# Patient Record
Sex: MALE | Race: WHITE | NOT HISPANIC OR LATINO | URBAN - METROPOLITAN AREA
[De-identification: names, ages, dates, MRNs, and addresses within clinical notes are randomized per-mention and may not be internally consistent; named-entity substitution may affect disease eponyms.]

---

## 2019-12-10 ENCOUNTER — INPATIENT (INPATIENT)
Facility: HOSPITAL | Age: 64
LOS: 2 days | Discharge: HOME | End: 2019-12-13
Attending: INTERNAL MEDICINE | Admitting: INTERNAL MEDICINE
Payer: MEDICAID

## 2019-12-10 VITALS
RESPIRATION RATE: 18 BRPM | HEIGHT: 70 IN | OXYGEN SATURATION: 99 % | DIASTOLIC BLOOD PRESSURE: 87 MMHG | HEART RATE: 104 BPM | SYSTOLIC BLOOD PRESSURE: 178 MMHG | TEMPERATURE: 99 F | WEIGHT: 181 LBS

## 2019-12-10 DIAGNOSIS — Z82.49 FAMILY HISTORY OF ISCHEMIC HEART DISEASE AND OTHER DISEASES OF THE CIRCULATORY SYSTEM: ICD-10-CM

## 2019-12-10 DIAGNOSIS — F17.210 NICOTINE DEPENDENCE, CIGARETTES, UNCOMPLICATED: ICD-10-CM

## 2019-12-10 DIAGNOSIS — I25.110 ATHEROSCLEROTIC HEART DISEASE OF NATIVE CORONARY ARTERY WITH UNSTABLE ANGINA PECTORIS: ICD-10-CM

## 2019-12-10 DIAGNOSIS — R07.9 CHEST PAIN, UNSPECIFIED: ICD-10-CM

## 2019-12-10 LAB
ALBUMIN SERPL ELPH-MCNC: 4.7 G/DL — SIGNIFICANT CHANGE UP (ref 3.5–5.2)
ALP SERPL-CCNC: 65 U/L — SIGNIFICANT CHANGE UP (ref 30–115)
ALT FLD-CCNC: 26 U/L — SIGNIFICANT CHANGE UP (ref 0–41)
ANION GAP SERPL CALC-SCNC: 11 MMOL/L — SIGNIFICANT CHANGE UP (ref 7–14)
APTT BLD: 33.8 SEC — SIGNIFICANT CHANGE UP (ref 27–39.2)
AST SERPL-CCNC: 25 U/L — SIGNIFICANT CHANGE UP (ref 0–41)
BASOPHILS # BLD AUTO: 0.05 K/UL — SIGNIFICANT CHANGE UP (ref 0–0.2)
BASOPHILS NFR BLD AUTO: 0.4 % — SIGNIFICANT CHANGE UP (ref 0–1)
BILIRUB SERPL-MCNC: 0.3 MG/DL — SIGNIFICANT CHANGE UP (ref 0.2–1.2)
BUN SERPL-MCNC: 12 MG/DL — SIGNIFICANT CHANGE UP (ref 10–20)
CALCIUM SERPL-MCNC: 10 MG/DL — SIGNIFICANT CHANGE UP (ref 8.5–10.1)
CHLORIDE SERPL-SCNC: 101 MMOL/L — SIGNIFICANT CHANGE UP (ref 98–110)
CO2 SERPL-SCNC: 28 MMOL/L — SIGNIFICANT CHANGE UP (ref 17–32)
CREAT SERPL-MCNC: 1 MG/DL — SIGNIFICANT CHANGE UP (ref 0.7–1.5)
EOSINOPHIL # BLD AUTO: 0.06 K/UL — SIGNIFICANT CHANGE UP (ref 0–0.7)
EOSINOPHIL NFR BLD AUTO: 0.5 % — SIGNIFICANT CHANGE UP (ref 0–8)
GLUCOSE SERPL-MCNC: 140 MG/DL — HIGH (ref 70–99)
HCT VFR BLD CALC: 44.3 % — SIGNIFICANT CHANGE UP (ref 42–52)
HGB BLD-MCNC: 14.7 G/DL — SIGNIFICANT CHANGE UP (ref 14–18)
IMM GRANULOCYTES NFR BLD AUTO: 0.4 % — HIGH (ref 0.1–0.3)
INR BLD: 1.06 RATIO — SIGNIFICANT CHANGE UP (ref 0.65–1.3)
LIDOCAIN IGE QN: 51 U/L — SIGNIFICANT CHANGE UP (ref 7–60)
LYMPHOCYTES # BLD AUTO: 33 % — SIGNIFICANT CHANGE UP (ref 20.5–51.1)
LYMPHOCYTES # BLD AUTO: 4.16 K/UL — HIGH (ref 1.2–3.4)
MAGNESIUM SERPL-MCNC: 2.1 MG/DL — SIGNIFICANT CHANGE UP (ref 1.8–2.4)
MCHC RBC-ENTMCNC: 29.5 PG — SIGNIFICANT CHANGE UP (ref 27–31)
MCHC RBC-ENTMCNC: 33.2 G/DL — SIGNIFICANT CHANGE UP (ref 32–37)
MCV RBC AUTO: 88.8 FL — SIGNIFICANT CHANGE UP (ref 80–94)
MONOCYTES # BLD AUTO: 0.77 K/UL — HIGH (ref 0.1–0.6)
MONOCYTES NFR BLD AUTO: 6.1 % — SIGNIFICANT CHANGE UP (ref 1.7–9.3)
NEUTROPHILS # BLD AUTO: 7.5 K/UL — HIGH (ref 1.4–6.5)
NEUTROPHILS NFR BLD AUTO: 59.6 % — SIGNIFICANT CHANGE UP (ref 42.2–75.2)
NRBC # BLD: 0 /100 WBCS — SIGNIFICANT CHANGE UP (ref 0–0)
NT-PROBNP SERPL-SCNC: 534 PG/ML — HIGH (ref 0–300)
PLATELET # BLD AUTO: 291 K/UL — SIGNIFICANT CHANGE UP (ref 130–400)
POTASSIUM SERPL-MCNC: 3.8 MMOL/L — SIGNIFICANT CHANGE UP (ref 3.5–5)
POTASSIUM SERPL-SCNC: 3.8 MMOL/L — SIGNIFICANT CHANGE UP (ref 3.5–5)
PROT SERPL-MCNC: 6.9 G/DL — SIGNIFICANT CHANGE UP (ref 6–8)
PROTHROM AB SERPL-ACNC: 12.2 SEC — SIGNIFICANT CHANGE UP (ref 9.95–12.87)
RBC # BLD: 4.99 M/UL — SIGNIFICANT CHANGE UP (ref 4.7–6.1)
RBC # FLD: 12.7 % — SIGNIFICANT CHANGE UP (ref 11.5–14.5)
SODIUM SERPL-SCNC: 140 MMOL/L — SIGNIFICANT CHANGE UP (ref 135–146)
TROPONIN T SERPL-MCNC: <0.01 NG/ML — SIGNIFICANT CHANGE UP
WBC # BLD: 12.59 K/UL — HIGH (ref 4.8–10.8)
WBC # FLD AUTO: 12.59 K/UL — HIGH (ref 4.8–10.8)

## 2019-12-10 PROCEDURE — 99285 EMERGENCY DEPT VISIT HI MDM: CPT

## 2019-12-10 RX ORDER — ASPIRIN/CALCIUM CARB/MAGNESIUM 324 MG
324 TABLET ORAL ONCE
Refills: 0 | Status: COMPLETED | OUTPATIENT
Start: 2019-12-10 | End: 2019-12-10

## 2019-12-10 RX ADMIN — Medication 324 MILLIGRAM(S): at 23:11

## 2019-12-10 NOTE — ED ADULT NURSE NOTE - NSIMPLEMENTINTERV_GEN_ALL_ED
Implemented All Universal Safety Interventions:  [place] to call system. Call bell, personal items and telephone within reach. Instruct patient to call for assistance. Room bathroom lighting operational. Non-slip footwear when patient is off stretcher. Physically safe environment: no spills, clutter or unnecessary equipment. Stretcher in lowest position, wheels locked, appropriate side rails in place.

## 2019-12-11 LAB
ANION GAP SERPL CALC-SCNC: 9 MMOL/L — SIGNIFICANT CHANGE UP (ref 7–14)
APTT BLD: 41.3 SEC — HIGH (ref 27–39.2)
APTT BLD: 46.4 SEC — HIGH (ref 27–39.2)
BASOPHILS # BLD AUTO: 0.04 K/UL — SIGNIFICANT CHANGE UP (ref 0–0.2)
BASOPHILS NFR BLD AUTO: 0.4 % — SIGNIFICANT CHANGE UP (ref 0–1)
BUN SERPL-MCNC: 15 MG/DL — SIGNIFICANT CHANGE UP (ref 10–20)
CALCIUM SERPL-MCNC: 9.3 MG/DL — SIGNIFICANT CHANGE UP (ref 8.5–10.1)
CHLORIDE SERPL-SCNC: 103 MMOL/L — SIGNIFICANT CHANGE UP (ref 98–110)
CHOLEST SERPL-MCNC: 205 MG/DL — HIGH (ref 100–200)
CK MB CFR SERPL CALC: 2.2 NG/ML — SIGNIFICANT CHANGE UP (ref 0.6–6.3)
CK MB CFR SERPL CALC: 2.3 NG/ML — SIGNIFICANT CHANGE UP (ref 0.6–6.3)
CK SERPL-CCNC: 88 U/L — SIGNIFICANT CHANGE UP (ref 0–225)
CK SERPL-CCNC: 95 U/L — SIGNIFICANT CHANGE UP (ref 0–225)
CO2 SERPL-SCNC: 29 MMOL/L — SIGNIFICANT CHANGE UP (ref 17–32)
CREAT SERPL-MCNC: 0.9 MG/DL — SIGNIFICANT CHANGE UP (ref 0.7–1.5)
EOSINOPHIL # BLD AUTO: 0.15 K/UL — SIGNIFICANT CHANGE UP (ref 0–0.7)
EOSINOPHIL NFR BLD AUTO: 1.7 % — SIGNIFICANT CHANGE UP (ref 0–8)
ESTIMATED AVERAGE GLUCOSE: 108 MG/DL — SIGNIFICANT CHANGE UP (ref 68–114)
GLUCOSE SERPL-MCNC: 100 MG/DL — HIGH (ref 70–99)
HBA1C BLD-MCNC: 5.4 % — SIGNIFICANT CHANGE UP (ref 4–5.6)
HCT VFR BLD CALC: 43.4 % — SIGNIFICANT CHANGE UP (ref 42–52)
HDLC SERPL-MCNC: 38 MG/DL — LOW
HGB BLD-MCNC: 14.2 G/DL — SIGNIFICANT CHANGE UP (ref 14–18)
IMM GRANULOCYTES NFR BLD AUTO: 0.3 % — SIGNIFICANT CHANGE UP (ref 0.1–0.3)
INR BLD: 1.04 RATIO — SIGNIFICANT CHANGE UP (ref 0.65–1.3)
INR BLD: 1.05 RATIO — SIGNIFICANT CHANGE UP (ref 0.65–1.3)
LIPID PNL WITH DIRECT LDL SERPL: 167 MG/DL — HIGH (ref 4–129)
LYMPHOCYTES # BLD AUTO: 3.07 K/UL — SIGNIFICANT CHANGE UP (ref 1.2–3.4)
LYMPHOCYTES # BLD AUTO: 33.8 % — SIGNIFICANT CHANGE UP (ref 20.5–51.1)
MAGNESIUM SERPL-MCNC: 2.1 MG/DL — SIGNIFICANT CHANGE UP (ref 1.8–2.4)
MCHC RBC-ENTMCNC: 29 PG — SIGNIFICANT CHANGE UP (ref 27–31)
MCHC RBC-ENTMCNC: 32.7 G/DL — SIGNIFICANT CHANGE UP (ref 32–37)
MCV RBC AUTO: 88.8 FL — SIGNIFICANT CHANGE UP (ref 80–94)
MONOCYTES # BLD AUTO: 0.72 K/UL — HIGH (ref 0.1–0.6)
MONOCYTES NFR BLD AUTO: 7.9 % — SIGNIFICANT CHANGE UP (ref 1.7–9.3)
NEUTROPHILS # BLD AUTO: 5.06 K/UL — SIGNIFICANT CHANGE UP (ref 1.4–6.5)
NEUTROPHILS NFR BLD AUTO: 55.9 % — SIGNIFICANT CHANGE UP (ref 42.2–75.2)
NRBC # BLD: 0 /100 WBCS — SIGNIFICANT CHANGE UP (ref 0–0)
PHOSPHATE SERPL-MCNC: 3.4 MG/DL — SIGNIFICANT CHANGE UP (ref 2.1–4.9)
PLATELET # BLD AUTO: 254 K/UL — SIGNIFICANT CHANGE UP (ref 130–400)
POTASSIUM SERPL-MCNC: 4.4 MMOL/L — SIGNIFICANT CHANGE UP (ref 3.5–5)
POTASSIUM SERPL-SCNC: 4.4 MMOL/L — SIGNIFICANT CHANGE UP (ref 3.5–5)
PROTHROM AB SERPL-ACNC: 12 SEC — SIGNIFICANT CHANGE UP (ref 9.95–12.87)
PROTHROM AB SERPL-ACNC: 12.1 SEC — SIGNIFICANT CHANGE UP (ref 9.95–12.87)
RBC # BLD: 4.89 M/UL — SIGNIFICANT CHANGE UP (ref 4.7–6.1)
RBC # FLD: 12.7 % — SIGNIFICANT CHANGE UP (ref 11.5–14.5)
SODIUM SERPL-SCNC: 141 MMOL/L — SIGNIFICANT CHANGE UP (ref 135–146)
TOTAL CHOLESTEROL/HDL RATIO MEASUREMENT: 5.4 RATIO — SIGNIFICANT CHANGE UP (ref 4–5.5)
TRIGL SERPL-MCNC: 87 MG/DL — SIGNIFICANT CHANGE UP (ref 10–149)
TROPONIN T SERPL-MCNC: <0.01 NG/ML — SIGNIFICANT CHANGE UP
TROPONIN T SERPL-MCNC: <0.01 NG/ML — SIGNIFICANT CHANGE UP
TSH SERPL-MCNC: 2.83 UIU/ML — SIGNIFICANT CHANGE UP (ref 0.27–4.2)
WBC # BLD: 9.07 K/UL — SIGNIFICANT CHANGE UP (ref 4.8–10.8)
WBC # FLD AUTO: 9.07 K/UL — SIGNIFICANT CHANGE UP (ref 4.8–10.8)

## 2019-12-11 PROCEDURE — 99223 1ST HOSP IP/OBS HIGH 75: CPT | Mod: AI

## 2019-12-11 PROCEDURE — 71045 X-RAY EXAM CHEST 1 VIEW: CPT | Mod: 26

## 2019-12-11 RX ORDER — ASPIRIN/CALCIUM CARB/MAGNESIUM 324 MG
325 TABLET ORAL ONCE
Refills: 0 | Status: COMPLETED | OUTPATIENT
Start: 2019-12-12 | End: 2019-12-12

## 2019-12-11 RX ORDER — ASPIRIN/CALCIUM CARB/MAGNESIUM 324 MG
81 TABLET ORAL DAILY
Refills: 0 | Status: DISCONTINUED | OUTPATIENT
Start: 2019-12-11 | End: 2019-12-13

## 2019-12-11 RX ORDER — ATORVASTATIN CALCIUM 80 MG/1
40 TABLET, FILM COATED ORAL AT BEDTIME
Refills: 0 | Status: DISCONTINUED | OUTPATIENT
Start: 2019-12-11 | End: 2019-12-12

## 2019-12-11 RX ORDER — CHLORHEXIDINE GLUCONATE 213 G/1000ML
1 SOLUTION TOPICAL
Refills: 0 | Status: DISCONTINUED | OUTPATIENT
Start: 2019-12-11 | End: 2019-12-13

## 2019-12-11 RX ORDER — MORPHINE SULFATE 50 MG/1
4 CAPSULE, EXTENDED RELEASE ORAL EVERY 6 HOURS
Refills: 0 | Status: DISCONTINUED | OUTPATIENT
Start: 2019-12-11 | End: 2019-12-11

## 2019-12-11 RX ORDER — CLOPIDOGREL BISULFATE 75 MG/1
75 TABLET, FILM COATED ORAL DAILY
Refills: 0 | Status: DISCONTINUED | OUTPATIENT
Start: 2019-12-12 | End: 2019-12-13

## 2019-12-11 RX ORDER — CLOPIDOGREL BISULFATE 75 MG/1
300 TABLET, FILM COATED ORAL ONCE
Refills: 0 | Status: COMPLETED | OUTPATIENT
Start: 2019-12-11 | End: 2019-12-11

## 2019-12-11 RX ORDER — HEPARIN SODIUM 5000 [USP'U]/ML
900 INJECTION INTRAVENOUS; SUBCUTANEOUS
Qty: 25000 | Refills: 0 | Status: DISCONTINUED | OUTPATIENT
Start: 2019-12-11 | End: 2019-12-11

## 2019-12-11 RX ORDER — HEPARIN SODIUM 5000 [USP'U]/ML
1000 INJECTION INTRAVENOUS; SUBCUTANEOUS
Qty: 25000 | Refills: 0 | Status: DISCONTINUED | OUTPATIENT
Start: 2019-12-11 | End: 2019-12-12

## 2019-12-11 RX ORDER — METOPROLOL TARTRATE 50 MG
25 TABLET ORAL
Refills: 0 | Status: DISCONTINUED | OUTPATIENT
Start: 2019-12-11 | End: 2019-12-13

## 2019-12-11 RX ADMIN — Medication 81 MILLIGRAM(S): at 14:43

## 2019-12-11 RX ADMIN — ATORVASTATIN CALCIUM 40 MILLIGRAM(S): 80 TABLET, FILM COATED ORAL at 21:58

## 2019-12-11 RX ADMIN — Medication 25 MILLIGRAM(S): at 17:31

## 2019-12-11 RX ADMIN — CLOPIDOGREL BISULFATE 300 MILLIGRAM(S): 75 TABLET, FILM COATED ORAL at 14:44

## 2019-12-11 RX ADMIN — HEPARIN SODIUM 10 UNIT(S)/HR: 5000 INJECTION INTRAVENOUS; SUBCUTANEOUS at 20:30

## 2019-12-11 RX ADMIN — MORPHINE SULFATE 4 MILLIGRAM(S): 50 CAPSULE, EXTENDED RELEASE ORAL at 06:28

## 2019-12-11 RX ADMIN — HEPARIN SODIUM 9 UNIT(S)/HR: 5000 INJECTION INTRAVENOUS; SUBCUTANEOUS at 12:31

## 2019-12-11 NOTE — H&P ADULT - ASSESSMENT
63 yo M with above PMHx presented with complaint of chest pressure for 2 weeks duration with increasing frequency in the past 3 days.    #Chest Pain, rule out Acute Coronary Syndrome  -EKG with TWI in V1, V2  -Troponin negative x1, f/u subsequent sets  -F/U 2D-Echo  -Awaiting Cardiology recommendations  -Continue ASA-81mg and high intensity statin  -F/U HgbA1C%, lipid panel, TSH    #Active Smoker  -Cessation advised, patient declines nicotine patch    Disposition: Home when medically stable. 63 yo M with above PMHx presented with complaint of chest pressure for 2 weeks duration with increasing frequency in the past 3 days.    #Chest Pain, rule out Acute Coronary Syndrome  -EKG with TWI in V1, V2  -Troponin negative x1, f/u subsequent sets  -F/U 2D-Echo  -Awaiting Cardiology recommendations  -Continue ASA-81mg and high intensity statin  -F/U HgbA1C%, lipid panel, TSH    #Leukocytosis, likely reactive  -F/U Urinalysis    #Active Smoker  -Cessation advised, patient declines nicotine patch    Disposition: Home when medically stable.

## 2019-12-11 NOTE — ED PROVIDER NOTE - CLINICAL SUMMARY MEDICAL DECISION MAKING FREE TEXT BOX
Pt  with no pmhx - does not follow with PMD  + smoker  pw "8" episodes  of intermittent  chets pain -  had  3 episosed  2 weeks ago - not triggered by anything  burning in nature  spigastrum to chest  a/w dullness to anterior chest and numbness left arm -  lasted a few seconds resolved on own -  no sob, diaphoresis n/v backpain or syncope.  This week patient had more epidoes no trigger - overe the past 2 days  whenhe would walk up the stairs the pain was triggered - no kathleen ein character - here tonight  as he had multipleepidoes.  In ed pt comfortable -  is in no pain and no distress.  EKG with T wave  inversions in v1 v2 ? biphasic,   TROp negative  cxr not wide - Patient admitted to  chest pain telemtry for  further investigation

## 2019-12-11 NOTE — H&P ADULT - NSICDXFAMILYHX_GEN_ALL_CORE_FT
FAMILY HISTORY:  Father  Still living? Unknown  FH: emphysema, Age at diagnosis: Age Unknown    Grandparent  Still living? Unknown  FH: CAD (coronary artery disease), Age at diagnosis: Age Unknown

## 2019-12-11 NOTE — CONSULT NOTE ADULT - ASSESSMENT
Patient with risk factors for cad. He has ekg changes. Rx as unstable angina. ASA beta plavix statin heparin echo. R/O MI

## 2019-12-11 NOTE — CHART NOTE - NSCHARTNOTEFT_GEN_A_CORE
Pt was seen and examined at bedside. he states that his chest pain continues to persist despite treatment. Pt was seen also by cardiology and after review of repeat EKG, given dynamic ST-T wave changes it was decided that pt be upgraded to CCU for further monitoring. CCU team made aware.

## 2019-12-11 NOTE — CHART NOTE - NSCHARTNOTEFT_GEN_A_CORE
Patient continues to have Chest pain : on/ off.  -pain started 3 weeks ago  -location: starts in Epigastric area, moves upward towards midsternal area, occasional radiation to left arm with tingling sensation.  -reports a lot of stress in his life lately  -episodes of anxiety can initiate these episodes.  -I was called to see patient for reported Chest pain but upon arrival he was pain free.  -will speak to Hospitalist  -Cardio consult pending Patient continues to have Chest pain : on/ off.  -pain started 3 weeks ago  -location: starts in Epigastric area, moves upward towards midsternal area, occasional radiation to left arm with tingling sensation.  -reports a lot of stress in his life lately  -episodes of anxiety can initiate these episodes.  -I was called to see patient for reported Chest pain but upon arrival he was pain free.  -will speak to Hospitalist  -Cardio consult pending  -initial CE/Trop negative: follow further results.

## 2019-12-11 NOTE — ED PROVIDER NOTE - OBJECTIVE STATEMENT
63 yo M with no significant PMHx presents to the ED c/o worsening left sided chest pain x 2 weeks. Pt states every time he gets stressed or exerts himself he begins to have sharp, burning pain in his left chest. Usually pain subsides after a few minutes if he rests. Pt came in today because over the past three days pain has been occurring more frequently and not always related to stressors. Pt denies hx of cardiac workup. He is daily smoker. He denies other complaints. Pt denies fever, chills, nausea, vomiting, abdominal pain, diarrhea, headache, dizziness, weakness, SOB, back pain, LOC, trauma, urinary symptoms, cough, calf pain/swelling, recent travel, recent surgery.

## 2019-12-11 NOTE — H&P ADULT - HISTORY OF PRESENT ILLNESS
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 Patient felt chest pain 6 times on day of presentation thereby prompting concern and ED visit. Patient denies shortness of breath, palpitations, similar symptoms in the past. Patient has not been to a physician in the past 5 years, states he has been stressed, just "picked up someone at the airport today and got upset".

## 2019-12-11 NOTE — ED PROVIDER NOTE - PHYSICAL EXAMINATION
VITAL SIGNS: I have reviewed nursing notes and confirm.  CONSTITUTIONAL: Well-developed; well-nourished; in no acute distress.  SKIN: Skin exam is warm and dry, no acute rash.  HEAD: Normocephalic; atraumatic.  EYES: Conjunctiva and sclera clear.  ENT: No nasal discharge; airway clear.   CARD: S1, S2 normal; no murmurs, gallops, or rubs. Regular rate and rhythm.  RESP: No wheezes, rales or rhonchi. Speaking in full sentences.   ABD: Normal bowel sounds; soft; non-distended; non-tender; No rebound or guarding.   EXT: Normal ROM. No clubbing, cyanosis or edema.  NEURO: Alert, oriented. Grossly unremarkable. No focal deficits.

## 2019-12-11 NOTE — ED PROVIDER NOTE - NS ED ROS FT
Review of Systems  Constitutional:  No fever, chills, malaise, generalized weakness.  Eyes:  No visual changes, eye pain, or discharge.  ENMT:  No hearing changes, pain, or discharge. No nasal congestion, discharge, or bleeding. No throat pain, swelling, or difficulty swallowing.  Cardiac:  No palpitations, syncope, or edema. (+) chest pain  Respiratory:  No dyspnea, cough. No hemoptysis.  GI:  No nausea, vomiting, diarrhea, or abdominal pain.   :  No dysuria, hematuria, frequency, or burning.   MS:  No back pain.  Skin:  No skin rash, pruritis, jaundice, or lesions.  Neuro:  No headache, dizziness, loss of sensation, or focal weakness.  No change in mental status.   Endocrine: No history of thyroid disease or diabetes.

## 2019-12-11 NOTE — CONSULT NOTE ADULT - SUBJECTIVE AND OBJECTIVE BOX
CARDIOLOGY CONSULT NOTE     CHIEF COMPLAINT/REASON FOR CONSULT:    HPI:  63 yo M with no significant PMHx, previous alcoholic quit 15 years, ago presented with complaint of epigastric (described as burning pain, denies heartburn and stating different from heartburn) as well as anterior chest pressure with intermittent radiation down left upper extremity for 2 weeks duration, occurring with increasing frequency for the past 3 days, pain is worsened on exertion. Patient felt chest pain 6 times on day of presentation thereby prompting concern and ED visit. Patient denies shortness of breath, palpitations, similar symptoms in the past. Patient has not been to a physician in the past 5 years, states he has been stressed, just "picked up someone at the airport today and got upset". (11 Dec 2019 01:27)      PAST MEDICAL & SURGICAL HISTORY:  Quit drinking alcohol  History of alcohol use      Cardiac Risks:   [ ]HTN, [ ] DM, [x ] Smoking, [x ] FH,  [ ] Lipids        MEDICATIONS:  MEDICATIONS  (STANDING):  aspirin enteric coated 81 milliGRAM(s) Oral daily  atorvastatin 40 milliGRAM(s) Oral at bedtime  chlorhexidine 4% Liquid 1 Application(s) Topical <User Schedule>      FAMILY HISTORY:  FH: emphysema (Father)  FH: CAD (coronary artery disease) (Grandparent)      SOCIAL HISTORY:      [ ] Marital status Divourced  Allergies    No Known Allergies    Intolerances    	    REVIEW OF SYSTEMS:  CONSTITUTIONAL: No fever, weight loss, or fatigue  EYES: No eye pain, visual disturbances, or discharge  ENMT:  No difficulty hearing, tinnitus, vertigo; No sinus or throat pain  NECK: No pain or stiffness  RESPIRATORY: No cough, wheezing, chills or hemoptysis; No Shortness of Breath  CARDIOVASCULAR: See above  GASTROINTESTINAL: No abdominal or epigastric pain. No nausea, vomiting, or hematemesis; No diarrhea or constipation. No melena or hematochezia.  GENITOURINARY: No dysuria, frequency, hematuria, or incontinence  NEUROLOGICAL: No headaches, memory loss, loss of strength, numbness, or tremors  SKIN: No itching, burning, rashes, or lesions   	      PHYSICAL EXAM:  T(C): 35.6 (12-11-19 @ 05:39), Max: 37.1 (12-10-19 @ 22:44)  HR: 63 (12-11-19 @ 05:39) (63 - 104)  BP: 146/84 (12-11-19 @ 05:39) (146/84 - 178/87)  RR: 16 (12-11-19 @ 05:39) (16 - 18)  SpO2: 99% (12-10-19 @ 22:44) (99% - 99%)  Wt(kg): --  I&O's Summary      Appearance: Normal	  Psychiatry: A & O x 3, Mood & affect appropriate  HEENT:   Normal oral mucosa, PERRL, EOMI	  Lymphatic: No lymphadenopathy  Cardiovascular: Normal S1 S2,RRR, No JVD, No murmurs  Respiratory: Lungs clear to auscultation	  Gastrointestinal:  Soft, Non-tender, + BS	  Skin: No rashes, No ecchymoses, No cyanosis	  Neurologic: Non-focal  Extremities: Normal range of motion, No clubbing, cyanosis or edema  Vascular: Peripheral pulses palpable 2+ bilaterally      ECG:  	nsr ant ischemia    	  LABS:	 	    CARDIAC MARKERS:          Serum Pro-Brain Natriuretic Peptide: 534 pg/mL (12-10 @ 22:55)                            14.2   9.07  )-----------( 254      ( 11 Dec 2019 07:58 )             43.4     12-11    141  |  103  |  15  ----------------------------<  100<H>  4.4   |  29  |  0.9    Ca    9.3      11 Dec 2019 07:58  Phos  3.4     12-11  Mg     2.1     12-11    TPro  6.9  /  Alb  4.7  /  TBili  0.3  /  DBili  x   /  AST  25  /  ALT  26  /  AlkPhos  65  12-10    PT/INR - ( 10 Dec 2019 22:55 )   PT: 12.20 sec;   INR: 1.06 ratio         PTT - ( 10 Dec 2019 22:55 )  PTT:33.8 sec  proBNP: Serum Pro-Brain Natriuretic Peptide: 534 pg/mL (12-10 @ 22:55)

## 2019-12-11 NOTE — H&P ADULT - NSHPPHYSICALEXAM_GEN_ALL_CORE
CONSTITUTIONAL: NAD, comfortable lying on stretcher  ENMT: EOMI, PERRLA, No tonsillar erythema, exudates, or enlargement, neck supple, No JVD  PSYCH: Alert & Oriented X3  RESPIRATORY: Clear to percussion bilaterally; No rales, rhonchi, wheezing, or rubs  CARDIOVASCULAR: Regular rate and rhythm; No murmurs, rubs, or gallops, negative edema  GASTROINTESTINAL: Soft, Nontender, Nondistended; Bowel sounds present  EXTREMITIES:  2+ Peripheral Pulses, No clubbing, cyanosis  SKIN: No rashes or lesions

## 2019-12-11 NOTE — H&P ADULT - NSHPLABSRESULTS_GEN_ALL_CORE
14.7   12.59 )-----------( 291      ( 10 Dec 2019 22:55 )             44.3       12-10    140  |  101  |  12  ----------------------------<  140<H>  3.8   |  28  |  1.0    Ca    10.0      10 Dec 2019 22:55  Mg     2.1     12-10    TPro  6.9  /  Alb  4.7  /  TBili  0.3  /  DBili  x   /  AST  25  /  ALT  26  /  AlkPhos  65  12-10          PT/INR - ( 10 Dec 2019 22:55 )   PT: 12.20 sec;   INR: 1.06 ratio         PTT - ( 10 Dec 2019 22:55 )  PTT:33.8 sec        CARDIAC MARKERS ( 10 Dec 2019 22:55 )  x     / <0.01 ng/mL / x     / x     / x

## 2019-12-12 LAB
ALBUMIN SERPL ELPH-MCNC: 4.2 G/DL — SIGNIFICANT CHANGE UP (ref 3.5–5.2)
ALP SERPL-CCNC: 63 U/L — SIGNIFICANT CHANGE UP (ref 30–115)
ALT FLD-CCNC: 31 U/L — SIGNIFICANT CHANGE UP (ref 0–41)
ANION GAP SERPL CALC-SCNC: 11 MMOL/L — SIGNIFICANT CHANGE UP (ref 7–14)
ANION GAP SERPL CALC-SCNC: 9 MMOL/L — SIGNIFICANT CHANGE UP (ref 7–14)
APTT BLD: 61.5 SEC — HIGH (ref 27–39.2)
AST SERPL-CCNC: 25 U/L — SIGNIFICANT CHANGE UP (ref 0–41)
BASOPHILS # BLD AUTO: 0.05 K/UL — SIGNIFICANT CHANGE UP (ref 0–0.2)
BASOPHILS NFR BLD AUTO: 0.4 % — SIGNIFICANT CHANGE UP (ref 0–1)
BILIRUB SERPL-MCNC: 0.3 MG/DL — SIGNIFICANT CHANGE UP (ref 0.2–1.2)
BUN SERPL-MCNC: 15 MG/DL — SIGNIFICANT CHANGE UP (ref 10–20)
BUN SERPL-MCNC: 15 MG/DL — SIGNIFICANT CHANGE UP (ref 10–20)
CALCIUM SERPL-MCNC: 9.1 MG/DL — SIGNIFICANT CHANGE UP (ref 8.5–10.1)
CALCIUM SERPL-MCNC: 9.3 MG/DL — SIGNIFICANT CHANGE UP (ref 8.5–10.1)
CHLORIDE SERPL-SCNC: 104 MMOL/L — SIGNIFICANT CHANGE UP (ref 98–110)
CHLORIDE SERPL-SCNC: 105 MMOL/L — SIGNIFICANT CHANGE UP (ref 98–110)
CO2 SERPL-SCNC: 25 MMOL/L — SIGNIFICANT CHANGE UP (ref 17–32)
CO2 SERPL-SCNC: 26 MMOL/L — SIGNIFICANT CHANGE UP (ref 17–32)
CREAT SERPL-MCNC: 0.7 MG/DL — SIGNIFICANT CHANGE UP (ref 0.7–1.5)
CREAT SERPL-MCNC: 0.8 MG/DL — SIGNIFICANT CHANGE UP (ref 0.7–1.5)
EOSINOPHIL # BLD AUTO: 0.16 K/UL — SIGNIFICANT CHANGE UP (ref 0–0.7)
EOSINOPHIL NFR BLD AUTO: 1.4 % — SIGNIFICANT CHANGE UP (ref 0–8)
GLUCOSE SERPL-MCNC: 90 MG/DL — SIGNIFICANT CHANGE UP (ref 70–99)
GLUCOSE SERPL-MCNC: 98 MG/DL — SIGNIFICANT CHANGE UP (ref 70–99)
HCT VFR BLD CALC: 43.3 % — SIGNIFICANT CHANGE UP (ref 42–52)
HCT VFR BLD CALC: 44.5 % — SIGNIFICANT CHANGE UP (ref 42–52)
HCV AB S/CO SERPL IA: 0.15 S/CO — SIGNIFICANT CHANGE UP (ref 0–0.99)
HCV AB SERPL-IMP: SIGNIFICANT CHANGE UP
HGB BLD-MCNC: 14 G/DL — SIGNIFICANT CHANGE UP (ref 14–18)
HGB BLD-MCNC: 15 G/DL — SIGNIFICANT CHANGE UP (ref 14–18)
IMM GRANULOCYTES NFR BLD AUTO: 0.4 % — HIGH (ref 0.1–0.3)
INR BLD: 1.06 RATIO — SIGNIFICANT CHANGE UP (ref 0.65–1.3)
LYMPHOCYTES # BLD AUTO: 27.1 % — SIGNIFICANT CHANGE UP (ref 20.5–51.1)
LYMPHOCYTES # BLD AUTO: 3.11 K/UL — SIGNIFICANT CHANGE UP (ref 1.2–3.4)
MCHC RBC-ENTMCNC: 29.1 PG — SIGNIFICANT CHANGE UP (ref 27–31)
MCHC RBC-ENTMCNC: 29.8 PG — SIGNIFICANT CHANGE UP (ref 27–31)
MCHC RBC-ENTMCNC: 32.3 G/DL — SIGNIFICANT CHANGE UP (ref 32–37)
MCHC RBC-ENTMCNC: 33.7 G/DL — SIGNIFICANT CHANGE UP (ref 32–37)
MCV RBC AUTO: 88.3 FL — SIGNIFICANT CHANGE UP (ref 80–94)
MCV RBC AUTO: 90 FL — SIGNIFICANT CHANGE UP (ref 80–94)
MONOCYTES # BLD AUTO: 0.94 K/UL — HIGH (ref 0.1–0.6)
MONOCYTES NFR BLD AUTO: 8.2 % — SIGNIFICANT CHANGE UP (ref 1.7–9.3)
NEUTROPHILS # BLD AUTO: 7.15 K/UL — HIGH (ref 1.4–6.5)
NEUTROPHILS NFR BLD AUTO: 62.5 % — SIGNIFICANT CHANGE UP (ref 42.2–75.2)
NRBC # BLD: 0 /100 WBCS — SIGNIFICANT CHANGE UP (ref 0–0)
NRBC # BLD: 0 /100 WBCS — SIGNIFICANT CHANGE UP (ref 0–0)
PLATELET # BLD AUTO: 257 K/UL — SIGNIFICANT CHANGE UP (ref 130–400)
PLATELET # BLD AUTO: 290 K/UL — SIGNIFICANT CHANGE UP (ref 130–400)
POTASSIUM SERPL-MCNC: 4.2 MMOL/L — SIGNIFICANT CHANGE UP (ref 3.5–5)
POTASSIUM SERPL-MCNC: 4.5 MMOL/L — SIGNIFICANT CHANGE UP (ref 3.5–5)
POTASSIUM SERPL-SCNC: 4.2 MMOL/L — SIGNIFICANT CHANGE UP (ref 3.5–5)
POTASSIUM SERPL-SCNC: 4.5 MMOL/L — SIGNIFICANT CHANGE UP (ref 3.5–5)
PROT SERPL-MCNC: 6.4 G/DL — SIGNIFICANT CHANGE UP (ref 6–8)
PROTHROM AB SERPL-ACNC: 12.2 SEC — SIGNIFICANT CHANGE UP (ref 9.95–12.87)
RBC # BLD: 4.81 M/UL — SIGNIFICANT CHANGE UP (ref 4.7–6.1)
RBC # BLD: 5.04 M/UL — SIGNIFICANT CHANGE UP (ref 4.7–6.1)
RBC # FLD: 12.9 % — SIGNIFICANT CHANGE UP (ref 11.5–14.5)
RBC # FLD: 13 % — SIGNIFICANT CHANGE UP (ref 11.5–14.5)
SODIUM SERPL-SCNC: 140 MMOL/L — SIGNIFICANT CHANGE UP (ref 135–146)
SODIUM SERPL-SCNC: 140 MMOL/L — SIGNIFICANT CHANGE UP (ref 135–146)
WBC # BLD: 11.46 K/UL — HIGH (ref 4.8–10.8)
WBC # BLD: 9.11 K/UL — SIGNIFICANT CHANGE UP (ref 4.8–10.8)
WBC # FLD AUTO: 11.46 K/UL — HIGH (ref 4.8–10.8)
WBC # FLD AUTO: 9.11 K/UL — SIGNIFICANT CHANGE UP (ref 4.8–10.8)

## 2019-12-12 PROCEDURE — 92928 PRQ TCAT PLMT NTRAC ST 1 LES: CPT | Mod: LD

## 2019-12-12 PROCEDURE — 93010 ELECTROCARDIOGRAM REPORT: CPT

## 2019-12-12 PROCEDURE — 93458 L HRT ARTERY/VENTRICLE ANGIO: CPT | Mod: 26,XU

## 2019-12-12 RX ORDER — SODIUM CHLORIDE 9 MG/ML
1000 INJECTION INTRAMUSCULAR; INTRAVENOUS; SUBCUTANEOUS
Refills: 0 | Status: DISCONTINUED | OUTPATIENT
Start: 2019-12-12 | End: 2019-12-13

## 2019-12-12 RX ORDER — ENOXAPARIN SODIUM 100 MG/ML
40 INJECTION SUBCUTANEOUS DAILY
Refills: 0 | Status: DISCONTINUED | OUTPATIENT
Start: 2019-12-12 | End: 2019-12-13

## 2019-12-12 RX ORDER — ATORVASTATIN CALCIUM 80 MG/1
80 TABLET, FILM COATED ORAL AT BEDTIME
Refills: 0 | Status: DISCONTINUED | OUTPATIENT
Start: 2019-12-12 | End: 2019-12-13

## 2019-12-12 RX ADMIN — Medication 25 MILLIGRAM(S): at 05:57

## 2019-12-12 RX ADMIN — Medication 25 MILLIGRAM(S): at 18:54

## 2019-12-12 RX ADMIN — SODIUM CHLORIDE 150 MILLILITER(S): 9 INJECTION INTRAMUSCULAR; INTRAVENOUS; SUBCUTANEOUS at 15:01

## 2019-12-12 RX ADMIN — Medication 81 MILLIGRAM(S): at 06:59

## 2019-12-12 RX ADMIN — ATORVASTATIN CALCIUM 80 MILLIGRAM(S): 80 TABLET, FILM COATED ORAL at 22:16

## 2019-12-12 RX ADMIN — CLOPIDOGREL BISULFATE 75 MILLIGRAM(S): 75 TABLET, FILM COATED ORAL at 06:59

## 2019-12-12 NOTE — CHART NOTE - NSCHARTNOTEFT_GEN_A_CORE
Preliminary Cardiac Catheterization Post-Procedure Report:12-12-19 @ 13:04    Procedure Performed:  [x] Left Heart Catheterization  [ ] Right Heart Catheterization  [x] Percutaneous Coronary Intervention    Primary Physician: Dr. Kirsten MD  Assistant(s): Dr. Jamari MD and Dr. Alexis MD    Preliminary Procedure Summary (Official full report to follow)    Pre-procedure diagnosis: Unstable angina  Post Procedure Diagnosis/Impression: 1 vessel CAD (prox LAD)    Left Heart Catheterization:  approximate EF%: 55%  [ ] Normal Coronary Arteries  [ ] Luminal Irregularities  [ ] non-obstructive CAD  [x] 1 vessel coronary artery disease (prox LAD)      Anesthesia Type  [x] conscious sedation  [x] local/regional anesthesia  [  ] general anesthesia    Estimated Blood Loss  [x ] less than 30 ml    Amount of Contrast used: 300 ml    Access  [ ] Rt. Femoral A  [ ] Rt. Femoral V  [x] Rt. Radial A (D-stat)  [ ] Rt. Brachial V    Condition of patient after procedure  [x] stable  [  ] guarded  [  ] satisfactory     CATH SUMMARY/FINDINGS:    Dominance:   [x] Right  [ ] Left                  LM: normal         LAD:            prox LAD: 90% diffuse lesion, s/p PCI and SALOMÓN x1  mid LAD: 40% tubular lesion after D2  distal LAD: normal                Diag:     Cx: normal    OM1: large sized vessel, normal    RCA:  prox RCA: mild atherosclerosis with 30% tubular lesion in the mid third of the vessel  mid RCA: minor luminal irregularities  distal RCA: normal    RPL: normal    RPDA: normal    Implants: PCI with balloon angioplasty and SALOMÓN x 1 (3.5x24mm) in prox LAD    Follow-up Care:  [x] Return to In-patient bed  [x] ASA 81mg, Plavix 75mg, lipitor, lopressor 25mg Q12  [x] intensive medical management  Monitor BUN/Cr  NS@150cc/hr for 10 hours  repeat EKG in am

## 2019-12-12 NOTE — PROGRESS NOTE ADULT - SUBJECTIVE AND OBJECTIVE BOX
SUBJECTIVE:    Today is hospital day 1d.   OVERNIGHT EVENTS: none as per RN staff    Did not see patient today as he was in cath lab since early AM.       MEDICATIONS:  STANDING MEDICATIONS  aspirin enteric coated 81 milliGRAM(s) Oral daily  atorvastatin 80 milliGRAM(s) Oral at bedtime  chlorhexidine 4% Liquid 1 Application(s) Topical <User Schedule>  clopidogrel Tablet 75 milliGRAM(s) Oral daily  enoxaparin Injectable 40 milliGRAM(s) SubCutaneous daily  metoprolol tartrate 25 milliGRAM(s) Oral two times a day  sodium chloride 0.9%. 1000 milliLiter(s) IV Continuous <Continuous>    PRN MEDICATIONS    VITALS:   T(F): 97.3  HR: 78  BP: 147/94  RR: --  SpO2: --          LABS:                        15.0   11.46 )-----------( 290      ( 12 Dec 2019 06:23 )             44.5     12-12    140  |  105  |  15  ----------------------------<  98  4.2   |  26  |  0.8    Ca    9.3      12 Dec 2019 06:23  Phos  3.4     12-11  Mg     2.1     12-11    TPro  6.4  /  Alb  4.2  /  TBili  0.3  /  DBili  x   /  AST  25  /  ALT  31  /  AlkPhos  63  12-12    PT/INR - ( 12 Dec 2019 06:23 )   PT: 12.20 sec;   INR: 1.06 ratio         PTT - ( 12 Dec 2019 06:23 )  PTT:61.5 sec          CARDIAC MARKERS ( 11 Dec 2019 15:13 )  x     / <0.01 ng/mL / 95 U/L / x     / 2.3 ng/mL  CARDIAC MARKERS ( 11 Dec 2019 07:58 )  x     / <0.01 ng/mL / 88 U/L / x     / 2.2 ng/mL  CARDIAC MARKERS ( 10 Dec 2019 22:55 )  x     / <0.01 ng/mL / x     / x     / x          RADIOLOGY:    PHYSICAL EXAM:    Physical exam not performed today as he was off site.

## 2019-12-12 NOTE — PROGRESS NOTE ADULT - ASSESSMENT
63 yo M with above PMHx presented with complaint of chest pressure for 2 weeks duration with increasing frequency in the past 3 days.    # NSTEMI  -EKG with TWI in V1, V2  -Troponin negative x2  - Typical chest pain  - Transferred to [facility] for cath today - SALOMÓN to prox LAD  - Echo pending    #Active Smoker  -Cessation advised, patient declines nicotine patch    Dispo: 24-48 hours

## 2019-12-13 VITALS — WEIGHT: 70.99 LBS | HEIGHT: 70 IN

## 2019-12-13 PROCEDURE — 99239 HOSP IP/OBS DSCHRG MGMT >30: CPT

## 2019-12-13 PROCEDURE — 93010 ELECTROCARDIOGRAM REPORT: CPT

## 2019-12-13 RX ORDER — CLOPIDOGREL BISULFATE 75 MG/1
1 TABLET, FILM COATED ORAL
Qty: 0 | Refills: 0 | DISCHARGE
Start: 2019-12-13

## 2019-12-13 RX ORDER — METOPROLOL TARTRATE 50 MG
1 TABLET ORAL
Qty: 0 | Refills: 0 | DISCHARGE
Start: 2019-12-13

## 2019-12-13 RX ORDER — ATORVASTATIN CALCIUM 80 MG/1
1 TABLET, FILM COATED ORAL
Qty: 0 | Refills: 0 | DISCHARGE
Start: 2019-12-13

## 2019-12-13 RX ORDER — ASPIRIN/CALCIUM CARB/MAGNESIUM 324 MG
1 TABLET ORAL
Qty: 0 | Refills: 0 | DISCHARGE
Start: 2019-12-13

## 2019-12-13 RX ORDER — LISINOPRIL 2.5 MG/1
1 TABLET ORAL
Qty: 30 | Refills: 0
Start: 2019-12-13 | End: 2020-01-11

## 2019-12-13 RX ADMIN — Medication 25 MILLIGRAM(S): at 05:54

## 2019-12-13 NOTE — DISCHARGE NOTE PROVIDER - NSDCCPCAREPLAN_GEN_ALL_CORE_FT
PRINCIPAL DISCHARGE DIAGNOSIS  Diagnosis: Unstable angina  Assessment and Plan of Treatment: 1. Unstable Angina   The patient had EKG changes (TWI in V1, V2) and negative troponin levels x3. He was upgraded to the CCU the same day for further monitoring, due to persistent chest pain and dynamic ST-T changes seen on repeat EKGs. The following day, patient was transferred to the [facility] for cardiac cath s/p SALOMÓN x1 in LAD. Patient was medically stabilized andwil will be discharged home with DAPT, Lipitor, Lisinopril, and Lopressor. He was instructed to follow-up with his PCP and cardiologist within 1-2 weeks of discharge.

## 2019-12-13 NOTE — CHART NOTE - NSCHARTNOTEFT_GEN_A_CORE
<<<RESIDENT DISCHARGE NOTE>>>     LATRICIA SOTOMAYOR  [id number]    VITAL SIGNS:  T(F): 97.3 (12-13-19 @ 05:24), Max: 97.4 (12-12-19 @ 18:16)  HR: 67 (12-13-19 @ 05:24)  BP: 128/65 (12-13-19 @ 05:24)  SpO2: 97% (12-12-19 @ 18:16)  Weight (kg): 32.2 (12-13-19 @ 10:19)  BMI (kg/m2): 10.2 (12-13-19 @ 10:19)    PHYSICAL EXAMINATION:  General: No acute distress, denies pain  Head & Neck: Atraumatic, no bruits, no thyromegaly  Pulmonary: Lung sounds clear and equal bilaterally, no crackles or wheeze  Cardiovascular: Regular rate and rhythm, no bruits, murmurs, or rubs  Gastrointestinal/Abdomen & Pelvis: Nondistended, nontender to palpation, no bruits, no masses  Neurologic/Motor: AAOx3, no tremors, no fasciculations, CN II-XII intact bilaterally     TEST RESULTS:                        14.0   9.11  )-----------( 257      ( 12 Dec 2019 17:04 )             43.3       12-12    140  |  104  |  15  ----------------------------<  90  4.5   |  25  |  0.7    Ca    9.1      12 Dec 2019 17:04    TPro  6.4  /  Alb  4.2  /  TBili  0.3  /  DBili  x   /  AST  25  /  ALT  31  /  AlkPhos  63  12-12      FINAL DISCHARGE INTERVIEW:  Resident(s) Present: (Name:_____Dr. Bharathi Solorzano MD________), RN Present: (Name:  _____RN______)    DISCHARGE MEDICATION RECONCILIATION  reviewed with Attending (Name:____Dr. Munguia_______)    DISPOSITION:   [ X ] Home,    [  ] Home with Visiting Nursing Services,   [    ]  SNF/ NH,    [   ] Acute Rehab (4A),   [   ] Other (Specify:_________)

## 2019-12-13 NOTE — DISCHARGE NOTE PROVIDER - NSDCMRMEDTOKEN_GEN_ALL_CORE_FT
aspirin 81 mg oral delayed release tablet: 1 tab(s) orally once a day  atorvastatin 80 mg oral tablet: 1 tab(s) orally once a day (at bedtime)  clopidogrel 75 mg oral tablet: 1 tab(s) orally once a day  lisinopril 5 mg oral tablet: 1 tab(s) orally once a day   metoprolol tartrate 25 mg oral tablet: 1 tab(s) orally 2 times a day

## 2019-12-13 NOTE — DISCHARGE NOTE PROVIDER - NSDCFUADDINST_GEN_ALL_CORE_FT
Please follow up with your primary care physician within 1 week of your discharge from [facility]. Please take all medications as prescribed. If you experience any worsening or recurrence of your symptoms, please call [phone] immediately or report to the nearest Emergency Department. If you have any questions or concerns, please do not hesitate to call the hospital at [phone].

## 2019-12-13 NOTE — PROGRESS NOTE ADULT - SUBJECTIVE AND OBJECTIVE BOX
LATRICIA SOTOMAYOR  64y Male    CHIEF COMPLAINT:    Patient is a 64y old  Male who presents with a chief complaint of chest pain (12 Dec 2019 15:22)      INTERVAL HPI/OVERNIGHT EVENTS:    Patient seen and examined.    ROS: All other systems are negative.    Vital Signs:    T(F): 97.3 (19 @ 05:24), Max: 97.4 (19 @ 18:16)  HR: 67 (19 @ 05:24) (63 - 67)  BP: 128/65 (19 @ 05:24) (128/65 - 145/70)  RR: 18 (19 @ 05:24) (18 - 18)  SpO2: 97% (19 @ 18:16) (97% - 97%)  I&O's Summary    12 Dec 2019 07:01  -  13 Dec 2019 07:00  --------------------------------------------------------  IN: 120 mL / OUT: 0 mL / NET: 120 mL      Daily Height in cm: 177.8 (12 Dec 2019 20:34)    Daily Weight in k.5 (13 Dec 2019 05:24)  CAPILLARY BLOOD GLUCOSE          PHYSICAL EXAM:    GENERAL:  NAD  SKIN: No rashes or lesions  HENT: Atrumatic. Normocephalic. PERRL. Moist membranes.  NECK: Supple, No JVD. No lymphadenopathy.  PULMONARY: CTA B/L. No wheezing. No rales  CVS: Normal S1, S2. Rate and Rythm are regular. No murmurs.  ABDOMEN/GI: Soft, Nontender, Nondistended; BS present  EXTREMITIES: Peripheral pulses intact. No edema B/L LE.  NEUROLOGIC:  No motor or sensory deficit.  PSYCH: Alert & oriented x 3    Consultant(s) Notes Reviewed:  [x ] YES  [ ] NO  Care Discussed with Consultants/Other Providers [ x] YES  [ ] NO    EKG reviewed  Telemetry reviewed    LABS:                        14.0   9.11  )-----------( 257      ( 12 Dec 2019 17:04 )             43.3         140  |  104  |  15  ----------------------------<  90  4.5   |  25  |  0.7    Ca    9.1      12 Dec 2019 17:04  Phos  3.4       Mg     2.1         TPro  6.4  /  Alb  4.2  /  TBili  0.3  /  DBili  x   /  AST  25  /  ALT  31  /  AlkPhos  63  12-12    PT/INR - ( 12 Dec 2019 06:23 )   PT: 12.20 sec;   INR: 1.06 ratio         PTT - ( 12 Dec 2019 06:23 )  PTT:61.5 sec  Serum Pro-Brain Natriuretic Peptide: 534 pg/mL (12-10-19 @ 22:55)    Trop <0.01, CKMB 2.3, CK 95, 19 @ 15:13  Trop <0.01, CKMB 2.2, CK 88, 19 @ 07:58  Trop <0.01, CKMB --, CK --, 12-10-19 @ 22:55        RADIOLOGY & ADDITIONAL TESTS:      Imaging or report Personally Reviewed:  [ ] YES  [ ] NO    Medications:  Standing  aspirin enteric coated 81 milliGRAM(s) Oral daily  atorvastatin 80 milliGRAM(s) Oral at bedtime  chlorhexidine 4% Liquid 1 Application(s) Topical <User Schedule>  clopidogrel Tablet 75 milliGRAM(s) Oral daily  enoxaparin Injectable 40 milliGRAM(s) SubCutaneous daily  metoprolol tartrate 25 milliGRAM(s) Oral two times a day  sodium chloride 0.9%. 1000 milliLiter(s) IV Continuous <Continuous>    PRN Meds      Case discussed with resident    Care discussed with pt/family BRAN LATRICIA  64y Male    CHIEF COMPLAINT:    Patient is a 64y old  Male who presents with a chief complaint of chest pain (12 Dec 2019 15:22)      INTERVAL HPI/OVERNIGHT EVENTS:    Patient seen and examined. No cp. No sob. No palpitations.     ROS: All other systems are negative.    Vital Signs:    T(F): 97.3 (19 @ 05:24), Max: 97.4 (19 @ 18:16)  HR: 67 (19 @ 05:24) (63 - 67)  BP: 128/65 (19 @ 05:24) (128/65 - 145/70)  RR: 18 (19 @ 05:24) (18 - 18)  SpO2: 97% (19 @ 18:16) (97% - 97%)  I&O's Summary    12 Dec 2019 07:01  -  13 Dec 2019 07:00  --------------------------------------------------------  IN: 120 mL / OUT: 0 mL / NET: 120 mL      Daily Height in cm: 177.8 (12 Dec 2019 20:34)    Daily Weight in k.5 (13 Dec 2019 05:24)  CAPILLARY BLOOD GLUCOSE          PHYSICAL EXAM:    GENERAL:  NAD  SKIN: No rashes or lesions  HENT: Atraumatic Normocephalic. PERRL. Moist membranes.  NECK: Supple, No JVD. No lymphadenopathy.  PULMONARY: CTA B/L. No wheezing. No rales  CVS: Normal S1, S2. Rate and Rhythm are regular. No murmurs.  ABDOMEN/GI: Soft, Nontender, Nondistended; BS present  EXTREMITIES: Peripheral pulses intact. No edema B/L LE.  NEUROLOGIC:  No motor or sensory deficit.  PSYCH: Alert & oriented x 3    Consultant(s) Notes Reviewed:  [x ] YES  [ ] NO  Care Discussed with Consultants/Other Providers [ x] YES  [ ] NO    EKG reviewed  Telemetry reviewed    LABS:                        14.0   9.11  )-----------( 257      ( 12 Dec 2019 17:04 )             43.3         140  |  104  |  15  ----------------------------<  90  4.5   |  25  |  0.7    Ca    9.1      12 Dec 2019 17:04  Phos  3.4       Mg     2.1         TPro  6.4  /  Alb  4.2  /  TBili  0.3  /  DBili  x   /  AST  25  /  ALT  31  /  AlkPhos  63  1212    PT/INR - ( 12 Dec 2019 06:23 )   PT: 12.20 sec;   INR: 1.06 ratio         PTT - ( 12 Dec 2019 06:23 )  PTT:61.5 sec  Serum Pro-Brain Natriuretic Peptide: 534 pg/mL (12-10-19 @ 22:55)    Trop <0.01, CKMB 2.3, CK 95, 19 @ 15:13  Trop <0.01, CKMB 2.2, CK 88, 19 @ 07:58  Trop <0.01, CKMB --, CK --, 12-10-19 @ 22:55        RADIOLOGY & ADDITIONAL TESTS:      Imaging or report Personally Reviewed:  [ ] YES  [ ] NO    Medications:  Standing  aspirin enteric coated 81 milliGRAM(s) Oral daily  atorvastatin 80 milliGRAM(s) Oral at bedtime  chlorhexidine 4% Liquid 1 Application(s) Topical <User Schedule>  clopidogrel Tablet 75 milliGRAM(s) Oral daily  enoxaparin Injectable 40 milliGRAM(s) SubCutaneous daily  metoprolol tartrate 25 milliGRAM(s) Oral two times a day  sodium chloride 0.9%. 1000 milliLiter(s) IV Continuous <Continuous>    PRN Meds      Case discussed with resident    Care discussed with pt/family

## 2019-12-13 NOTE — DISCHARGE NOTE NURSING/CASE MANAGEMENT/SOCIAL WORK - PATIENT PORTAL LINK FT
You can access the FollowMyHealth Patient Portal offered by [facility] by registering at the following website: [email or web address]. By joining [employer]’s FollowMyHealth portal, you will also be able to view your health information using other applications (apps) compatible with our system.

## 2019-12-13 NOTE — DISCHARGE NOTE PROVIDER - CARE PROVIDER_API CALL
Armando Jay)  Cardiovascular Disease; Internal Medicine  [address] [place] [id number]  Phone: [phone]  Fax: [phone]  Follow Up Time: 1 week

## 2019-12-13 NOTE — DISCHARGE NOTE PROVIDER - HOSPITAL COURSE
Mr. To presented to the [facility] on 12/11/19 with complaint of chest pressure for 2 weeks duration with increasing frequency in the past 3 days. Upon admission, patient had EKG changes (TWI in V1, V2) and negative troponin levels x3. He was upgraded to the CCU the same day for further monitoring, due to persistent chest pain and dynamic ST-T changes seen on repeat EKGs. The following day, patient was transferred to the [facility] for cardiac cath s/p SALOMÓN x1 in LAD. Patient was medically stabilized andwil will be discharged home with DAPT, Lipitor, Lisinopril, and Lopressor. While in the hospital, the patient was treated for the following conditions:         1. Unstable Angina     The patient had EKG changes (TWI in V1, V2) and negative troponin levels x3. He was upgraded to the CCU the same day for further monitoring, due to persistent chest pain and dynamic ST-T changes seen on repeat EKGs. The following day, patient was transferred to the [facility] for cardiac cath s/p SALOMÓN x1 in LAD. Patient was medically stabilized andwil will be discharged home with DAPT, Lipitor, Lisinopril, and Lopressor. He was instructed to follow-up with his PCP and cardiologist within 1-2 weeks of discharge.

## 2019-12-13 NOTE — PROGRESS NOTE ADULT - ASSESSMENT
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    1.	USA  2.	DL         PLAN:    ·	S/P cath and mid LAD SALOMÓN  ·	Cont DAPT  ·	Pt has  and Trig 205. Cont Lipitor 80 mg po qhs  ·	Cont Metoprolol  ·	CE x 3 negative  ·	Check ECHO 63 yo M with no significant PMHx, previous alcoholic quit 15 years, ago presented with complaint of epigastric (described as burning pain, denies heartburn and stating different from heartburn) as well as anterior chest pressure with intermittent radiation down left upper extremity for 2 weeks duration, occurring with increasing frequency for the past 3 days, pain is worsened on exertion.    1.	USA  2.	DL         PLAN:    ·	S/P cath and mid LAD SALOMÓN  ·	Cont DAPT  ·	Pt has  and Trig 205. Cont Lipitor 80 mg po qhs  ·	Cont Metoprolol  ·	CE x 3 negative  ·	ECHO as an out pt. On left heart cath EF was 55%    * Med reviewed. Plan of care D/W the pt. Time spent 40 minutes. 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    1.	USA  2.	DL         PLAN:    ·	S/P cath and mid LAD SALOMÓN  ·	Cont DAPT  ·	Pt has  and Trig 205. Cont Lipitor 80 mg po qhs  ·	Cont Metoprolol  ·	CE x 3 negative  ·	ECHO as an out pt. On left heart cath EF was 55%  ·	Added Lisinopril 5 mg po daily    * Med reviewed. Plan of care D/W the pt. Time spent 40 minutes.

## 2019-12-13 NOTE — PROGRESS NOTE ADULT - SUBJECTIVE AND OBJECTIVE BOX
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 Patient felt chest pain 6 times on day of presentation thereby prompting concern and ED visit. Patient denies shortness of breath, palpitations, similar symptoms in the past. Patient has not been to a physician in the past 5 years, states he has been stressed, just "picked up someone at the airport today and got upset". (11 Dec 2019 01:27)    Allergies    No Known Allergies    Intolerances      Patient without complaints. Pt ambulated without issues/symptoms  Denies CP, SOB, palpitations, or dizziness  No events on telemetry overnight    Vital Signs Last 24 Hrs  T(C): 36.3 (13 Dec 2019 05:24), Max: 36.3 (12 Dec 2019 18:16)  T(F): 97.3 (13 Dec 2019 05:24), Max: 97.4 (12 Dec 2019 18:16)  HR: 67 (13 Dec 2019 05:24) (63 - 67)  BP: 128/65 (13 Dec 2019 05:24) (128/65 - 145/70)  BP(mean): --  RR: 18 (13 Dec 2019 05:24) (18 - 18)  SpO2: 97% (12 Dec 2019 18:16) (97% - 97%)    MEDICATIONS  (STANDING):  aspirin enteric coated 81 milliGRAM(s) Oral daily  atorvastatin 80 milliGRAM(s) Oral at bedtime  chlorhexidine 4% Liquid 1 Application(s) Topical <User Schedule>  clopidogrel Tablet 75 milliGRAM(s) Oral daily  enoxaparin Injectable 40 milliGRAM(s) SubCutaneous daily  metoprolol tartrate 25 milliGRAM(s) Oral two times a day  sodium chloride 0.9%. 1000 milliLiter(s) (150 mL/Hr) IV Continuous <Continuous>    MEDICATIONS  (PRN):      REVIEW OF SYSTEMS:          CONSTITUTIONAL: No weakness, fevers or chills          EYES/ENT: No visual changes;  No vertigo or throat pain           NECK: No pain or stiffness          RESPIRATORY: No cough, wheezing, hemoptysis          CARDIOVASCULAR: no pain, no DIAMOND, no palpitations           GASTROINTESTINAL: No abdominal or epigastric pain. No nausea, vomiting, or hematemesis;           GENITOURINARY: No dysuria, frequency or hematuria          NEUROLOGICAL: No numbness or weakness          SKIN: No itching, rashes    PHYSICAL EXAM:           CONSTITUTIONAL: Well-developed; well-nourished; in no acute distress  	SKIN: warm, dry  	HEAD: Normocephalic; atraumatic  	EYES: PERRL.  	ENT: No nasal discharge, airway clear, mucous membranes moist  	NECK: Supple; non tender.  	CARD: +S1, +S2, no murmurs, gallops, or rubs. (Regular) rate and rhythm    	RESP: No wheezes, rales or rhonchi. CTA B/L  	ABD: soft ntnd, + BS x 4 quadrants  	EXT: moves all extremities,  no clubbing, cyanosis or edema  	NEURO: Alert and oriented x3, no focal deficits          PSYCH: Cooperative, appropriate          VASCULAR:  + Rad / + PTs / + DPs          EXTREMITY:  	   Right Radial: Dressing removed, + pulses,  access site soft, no hematoma, no pain, no numbness, no signs and symptoms of infection             ECG: Ventricular Rate 56 BPM    Atrial Rate 56 BPM    P-R Interval 166 ms    QRS Duration 94 ms    Q-T Interval 420 ms    QTC Calculation(Bezet) 405 ms    P Axis 72 degrees    R Axis 69 degrees    T Axis 63 degrees    Diagnosis Line Sinus bradycardia  Otherwise normal ECG    Confirmed by SIN ALONSO MD ([id number]) on 12/13/2019 9:07:03 AM                                                                                                                  2D ECHO:  P    LABS:                        14.0   9.11  )-----------( 257      ( 12 Dec 2019 17:04 )             43.3     12-12    140  |  104  |  15  ----------------------------<  90  4.5   |  25  |  0.7    Ca    9.1      12 Dec 2019 17:04    TPro  6.4  /  Alb  4.2  /  TBili  0.3  /  DBili  x   /  AST  25  /  ALT  31  /  AlkPhos  63  12-12    CARDIAC MARKERS ( 11 Dec 2019 15:13 )  x     / <0.01 ng/mL / 95 U/L / x     / 2.3 ng/mL      LIVER FUNCTIONS - ( 12 Dec 2019 06:23 )  Alb: 4.2 g/dL / Pro: 6.4 g/dL / ALK PHOS: 63 U/L / ALT: 31 U/L / AST: 25 U/L / GGT: x             A/P:  I discussed the case with Cardiologist Dr. Anderson  and recommend the following:    S/P PCI pLAD  	     Continue DAPT (asa 81mg daily, plavix 75mg daily),  B-Blocker, Statin Therapy                   Patient given 30 day supply of ( Aspirin 81 mg daily and Plavix 75 mg daily ) to take at home                   oob-ch, ambulate                    smoking cessation education reinforced                   Patient agreeing to take DAPT for at least one year or as directed by cardiologist                    Pt given instructions on importance of taking antiplatelet medication or risk acute stent thrombosis/death                   Post cath instructions, access site care and activity restrictions reviewed with patient                     Discussed with patient to return to hospital if experience chest pain, shortness breath, dizziness and site bleeding                   Aggressive risk factor modification, diet counseling, smoking cessation discussed with patient                       Can discharge patient from cardiac standpoint as discussed with Dr. Anderson                    Follow up with Cardiology Dr. Curtis in 1-2 week.  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 Patient felt chest pain 6 times on day of presentation thereby prompting concern and ED visit. Patient denies shortness of breath, palpitations, similar symptoms in the past. Patient has not been to a physician in the past 5 years, states he has been stressed, just "picked up someone at the airport today and got upset". (11 Dec 2019 01:27)    Allergies    No Known Allergies    Intolerances      Patient without complaints. Pt ambulated without issues/symptoms  Denies CP, SOB, palpitations, or dizziness  No events on telemetry overnight    Vital Signs Last 24 Hrs  T(C): 36.3 (13 Dec 2019 05:24), Max: 36.3 (12 Dec 2019 18:16)  T(F): 97.3 (13 Dec 2019 05:24), Max: 97.4 (12 Dec 2019 18:16)  HR: 67 (13 Dec 2019 05:24) (63 - 67)  BP: 128/65 (13 Dec 2019 05:24) (128/65 - 145/70)  BP(mean): --  RR: 18 (13 Dec 2019 05:24) (18 - 18)  SpO2: 97% (12 Dec 2019 18:16) (97% - 97%)    MEDICATIONS  (STANDING):  aspirin enteric coated 81 milliGRAM(s) Oral daily  atorvastatin 80 milliGRAM(s) Oral at bedtime  chlorhexidine 4% Liquid 1 Application(s) Topical <User Schedule>  clopidogrel Tablet 75 milliGRAM(s) Oral daily  enoxaparin Injectable 40 milliGRAM(s) SubCutaneous daily  metoprolol tartrate 25 milliGRAM(s) Oral two times a day  sodium chloride 0.9%. 1000 milliLiter(s) (150 mL/Hr) IV Continuous <Continuous>    MEDICATIONS  (PRN):      REVIEW OF SYSTEMS:          CONSTITUTIONAL: No weakness, fevers or chills          EYES/ENT: No visual changes;  No vertigo or throat pain           NECK: No pain or stiffness          RESPIRATORY: No cough, wheezing, hemoptysis          CARDIOVASCULAR: no pain, no DIAMOND, no palpitations           GASTROINTESTINAL: No abdominal or epigastric pain. No nausea, vomiting, or hematemesis;           GENITOURINARY: No dysuria, frequency or hematuria          NEUROLOGICAL: No numbness or weakness          SKIN: No itching, rashes    PHYSICAL EXAM:           CONSTITUTIONAL: Well-developed; well-nourished; in no acute distress  	SKIN: warm, dry  	HEAD: Normocephalic; atraumatic  	EYES: PERRL.  	ENT: No nasal discharge, airway clear, mucous membranes moist  	NECK: Supple; non tender.  	CARD: +S1, +S2, no murmurs, gallops, or rubs. (Regular) rate and rhythm    	RESP: No wheezes, rales or rhonchi. CTA B/L  	ABD: soft ntnd, + BS x 4 quadrants  	EXT: moves all extremities,  no clubbing, cyanosis or edema  	NEURO: Alert and oriented x3, no focal deficits          PSYCH: Cooperative, appropriate          VASCULAR:  + Rad / + PTs / + DPs          EXTREMITY:  	   Right Radial: Dressing removed, + pulses,  access site soft, no hematoma, no pain, no numbness, no signs and symptoms of infection             ECG: Ventricular Rate 56 BPM    Atrial Rate 56 BPM    P-R Interval 166 ms    QRS Duration 94 ms    Q-T Interval 420 ms    QTC Calculation(Bezet) 405 ms    P Axis 72 degrees    R Axis 69 degrees    T Axis 63 degrees    Diagnosis Line Sinus bradycardia  Otherwise normal ECG    Confirmed by SIN ALONSO MD ([id number]) on 12/13/2019 9:07:03 AM                                                                                                                  2D ECHO:  P    LABS:                        14.0   9.11  )-----------( 257      ( 12 Dec 2019 17:04 )             43.3     12-12    140  |  104  |  15  ----------------------------<  90  4.5   |  25  |  0.7    Ca    9.1      12 Dec 2019 17:04    TPro  6.4  /  Alb  4.2  /  TBili  0.3  /  DBili  x   /  AST  25  /  ALT  31  /  AlkPhos  63  12-12    CARDIAC MARKERS ( 11 Dec 2019 15:13 )  x     / <0.01 ng/mL / 95 U/L / x     / 2.3 ng/mL      LIVER FUNCTIONS - ( 12 Dec 2019 06:23 )  Alb: 4.2 g/dL / Pro: 6.4 g/dL / ALK PHOS: 63 U/L / ALT: 31 U/L / AST: 25 U/L / GGT: x             A/P:  I discussed the case with Cardiologist Dr. Anderson  and recommend the following:    S/P PCI pLAD  	     Continue DAPT (asa 81mg daily, plavix 75mg daily),  B-Blocker, Statin Therapy                   Patient given 30 day supply of ( Aspirin 81 mg daily and Plavix 75 mg daily ) to take at home                   f/u echo results                    oob-ch, ambulate                    smoking cessation education reinforced                   Patient agreeing to take DAPT for at least one year or as directed by cardiologist                    Pt given instructions on importance of taking antiplatelet medication or risk acute stent thrombosis/death                   Post cath instructions, access site care and activity restrictions reviewed with patient                     Discussed with patient to return to hospital if experience chest pain, shortness breath, dizziness and site bleeding                   Aggressive risk factor modification, diet counseling, smoking cessation discussed with patient                       Can discharge patient from cardiac standpoint as discussed with Dr. Anderson                    Follow up with Cardiology Dr. Curtis in 1-2 week.  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 Patient felt chest pain 6 times on day of presentation thereby prompting concern and ED visit. Patient denies shortness of breath, palpitations, similar symptoms in the past. Patient has not been to a physician in the past 5 years, states he has been stressed, just "picked up someone at the airport today and got upset". (11 Dec 2019 01:27)    Allergies    No Known Allergies    Intolerances      Patient without complaints. Pt ambulated without issues/symptoms  Denies CP, SOB, palpitations, or dizziness  No events on telemetry overnight    Vital Signs Last 24 Hrs  T(C): 36.3 (13 Dec 2019 05:24), Max: 36.3 (12 Dec 2019 18:16)  T(F): 97.3 (13 Dec 2019 05:24), Max: 97.4 (12 Dec 2019 18:16)  HR: 67 (13 Dec 2019 05:24) (63 - 67)  BP: 128/65 (13 Dec 2019 05:24) (128/65 - 145/70)  BP(mean): --  RR: 18 (13 Dec 2019 05:24) (18 - 18)  SpO2: 97% (12 Dec 2019 18:16) (97% - 97%)    MEDICATIONS  (STANDING):  aspirin enteric coated 81 milliGRAM(s) Oral daily  atorvastatin 80 milliGRAM(s) Oral at bedtime  chlorhexidine 4% Liquid 1 Application(s) Topical <User Schedule>  clopidogrel Tablet 75 milliGRAM(s) Oral daily  enoxaparin Injectable 40 milliGRAM(s) SubCutaneous daily  metoprolol tartrate 25 milliGRAM(s) Oral two times a day  sodium chloride 0.9%. 1000 milliLiter(s) (150 mL/Hr) IV Continuous <Continuous>    MEDICATIONS  (PRN):      REVIEW OF SYSTEMS:          CONSTITUTIONAL: No weakness, fevers or chills          EYES/ENT: No visual changes;  No vertigo or throat pain           NECK: No pain or stiffness          RESPIRATORY: No cough, wheezing, hemoptysis          CARDIOVASCULAR: no pain, no DIAMOND, no palpitations           GASTROINTESTINAL: No abdominal or epigastric pain. No nausea, vomiting, or hematemesis;           GENITOURINARY: No dysuria, frequency or hematuria          NEUROLOGICAL: No numbness or weakness          SKIN: No itching, rashes    PHYSICAL EXAM:           CONSTITUTIONAL: Well-developed; well-nourished; in no acute distress  	SKIN: warm, dry  	HEAD: Normocephalic; atraumatic  	EYES: PERRL.  	ENT: No nasal discharge, airway clear, mucous membranes moist  	NECK: Supple; non tender.  	CARD: +S1, +S2, no murmurs, gallops, or rubs. (Regular) rate and rhythm    	RESP: No wheezes, rales or rhonchi. CTA B/L  	ABD: soft ntnd, + BS x 4 quadrants  	EXT: moves all extremities,  no clubbing, cyanosis or edema  	NEURO: Alert and oriented x3, no focal deficits          PSYCH: Cooperative, appropriate          VASCULAR:  + Rad / + PTs / + DPs          EXTREMITY:  	   Right Radial: Dressing removed, + pulses,  access site soft, no hematoma, no pain, no numbness, no signs and symptoms of infection             ECG: Ventricular Rate 56 BPM    Atrial Rate 56 BPM    P-R Interval 166 ms    QRS Duration 94 ms    Q-T Interval 420 ms    QTC Calculation(Bezet) 405 ms    P Axis 72 degrees    R Axis 69 degrees    T Axis 63 degrees    Diagnosis Line Sinus bradycardia  Otherwise normal ECG    Confirmed by SIN ALONSO MD ([id number]) on 12/13/2019 9:07:03 AM                                                                                                                  2D ECHO:  P    LABS:                        14.0   9.11  )-----------( 257      ( 12 Dec 2019 17:04 )             43.3     12-12    140  |  104  |  15  ----------------------------<  90  4.5   |  25  |  0.7    Ca    9.1      12 Dec 2019 17:04    TPro  6.4  /  Alb  4.2  /  TBili  0.3  /  DBili  x   /  AST  25  /  ALT  31  /  AlkPhos  63  12-12    CARDIAC MARKERS ( 11 Dec 2019 15:13 )  x     / <0.01 ng/mL / 95 U/L / x     / 2.3 ng/mL      LIVER FUNCTIONS - ( 12 Dec 2019 06:23 )  Alb: 4.2 g/dL / Pro: 6.4 g/dL / ALK PHOS: 63 U/L / ALT: 31 U/L / AST: 25 U/L / GGT: x             A/P:  I discussed the case with Cardiologist Dr. Anderson  and recommend the following:    S/P PCI pLAD  	     Continue DAPT (asa 81mg daily, plavix 75mg daily),  B-Blocker, Statin Therapy                   Patient given 30 day supply of ( Aspirin 81 mg daily and Plavix 75 mg daily ) to take at home                   f/u echo results.                    oob-ch, ambulate                    smoking cessation education reinforced                   Patient agreeing to take DAPT for at least one year or as directed by cardiologist                    Pt given instructions on importance of taking antiplatelet medication or risk acute stent thrombosis/death                   Post cath instructions, access site care and activity restrictions reviewed with patient                     Discussed with patient to return to hospital if experience chest pain, shortness breath, dizziness and site bleeding                   Aggressive risk factor modification, diet counseling, smoking cessation discussed with patient                       Can discharge patient from cardiac standpoint as discussed with Dr. Anderson                    Follow up with Cardiology Dr. Curtis in 1-2 week.  Instructed to call and make an appointment
